# Patient Record
Sex: FEMALE | Race: WHITE | HISPANIC OR LATINO | Employment: OTHER | URBAN - METROPOLITAN AREA
[De-identification: names, ages, dates, MRNs, and addresses within clinical notes are randomized per-mention and may not be internally consistent; named-entity substitution may affect disease eponyms.]

---

## 2019-04-01 ENCOUNTER — OFFICE VISIT (OUTPATIENT)
Dept: PRIMARY CARE CLINIC | Facility: CLINIC | Age: 84
End: 2019-04-01
Payer: MEDICARE

## 2019-04-01 VITALS
DIASTOLIC BLOOD PRESSURE: 60 MMHG | OXYGEN SATURATION: 93 % | WEIGHT: 180 LBS | SYSTOLIC BLOOD PRESSURE: 132 MMHG | BODY MASS INDEX: 30.73 KG/M2 | HEART RATE: 71 BPM | RESPIRATION RATE: 16 BRPM | HEIGHT: 64 IN

## 2019-04-01 DIAGNOSIS — E03.9 HYPOTHYROIDISM, UNSPECIFIED TYPE: ICD-10-CM

## 2019-04-01 DIAGNOSIS — I25.10 CORONARY ARTERY DISEASE, ANGINA PRESENCE UNSPECIFIED, UNSPECIFIED VESSEL OR LESION TYPE, UNSPECIFIED WHETHER NATIVE OR TRANSPLANTED HEART: ICD-10-CM

## 2019-04-01 DIAGNOSIS — I50.9 CONGESTIVE HEART FAILURE, UNSPECIFIED HF CHRONICITY, UNSPECIFIED HEART FAILURE TYPE: ICD-10-CM

## 2019-04-01 DIAGNOSIS — I10 BENIGN ESSENTIAL HYPERTENSION: ICD-10-CM

## 2019-04-01 DIAGNOSIS — F03.90 DEMENTIA WITHOUT BEHAVIORAL DISTURBANCE, UNSPECIFIED DEMENTIA TYPE: Primary | ICD-10-CM

## 2019-04-01 PROCEDURE — 99214 OFFICE O/P EST MOD 30 MIN: CPT | Mod: S$GLB,,, | Performed by: FAMILY MEDICINE

## 2019-04-01 PROCEDURE — 99214 PR OFFICE/OUTPT VISIT, EST, LEVL IV, 30-39 MIN: ICD-10-PCS | Mod: S$GLB,,, | Performed by: FAMILY MEDICINE

## 2019-04-01 RX ORDER — ASPIRIN 81 MG/1
81 TABLET ORAL DAILY
COMMUNITY

## 2019-04-01 RX ORDER — EZETIMIBE 10 MG/1
TABLET ORAL
COMMUNITY
End: 2019-04-01

## 2019-04-01 RX ORDER — TRAMADOL HYDROCHLORIDE 50 MG/1
TABLET ORAL
COMMUNITY
Start: 2017-03-30

## 2019-04-01 RX ORDER — LEVOTHYROXINE SODIUM 88 UG/1
TABLET ORAL
COMMUNITY
Start: 2019-01-06

## 2019-04-01 RX ORDER — PRAVASTATIN SODIUM 20 MG/1
TABLET ORAL
COMMUNITY
Start: 2019-01-25 | End: 2019-04-01

## 2019-04-01 RX ORDER — HYDROCODONE BITARTRATE AND ACETAMINOPHEN 7.5; 325 MG/1; MG/1
TABLET ORAL
COMMUNITY
End: 2019-04-01

## 2019-04-01 RX ORDER — LEVOFLOXACIN 500 MG/1
TABLET, FILM COATED ORAL
COMMUNITY
End: 2019-04-01

## 2019-04-01 RX ORDER — AMLODIPINE BESYLATE 5 MG/1
5 TABLET ORAL DAILY
COMMUNITY

## 2019-04-01 RX ORDER — NORTRIPTYLINE HYDROCHLORIDE 10 MG/1
CAPSULE ORAL
COMMUNITY
Start: 2019-01-24 | End: 2019-07-17 | Stop reason: SDUPTHER

## 2019-04-01 RX ORDER — CHOLECALCIFEROL (VITAMIN D3) 25 MCG
1000 TABLET ORAL DAILY
COMMUNITY

## 2019-04-01 RX ORDER — MELOXICAM 7.5 MG/1
TABLET ORAL
COMMUNITY
End: 2019-04-01

## 2019-04-01 RX ORDER — PROMETHAZINE HYDROCHLORIDE 25 MG/1
25 TABLET ORAL EVERY 6 HOURS PRN
COMMUNITY

## 2019-04-01 RX ORDER — ASCORBIC ACID 500 MG
500 TABLET ORAL DAILY
COMMUNITY

## 2019-04-01 RX ORDER — LORAZEPAM 0.5 MG/1
TABLET ORAL
COMMUNITY
End: 2019-10-01 | Stop reason: SDUPTHER

## 2019-04-01 RX ORDER — POTASSIUM CHLORIDE 750 MG/1
10 TABLET, EXTENDED RELEASE ORAL
COMMUNITY
Start: 2019-01-17 | End: 2019-12-03 | Stop reason: SDUPTHER

## 2019-04-01 RX ORDER — CALCIUM CARBONATE 200(500)MG
1 TABLET,CHEWABLE ORAL DAILY
COMMUNITY

## 2019-04-01 RX ORDER — FUROSEMIDE 40 MG/1
TABLET ORAL
COMMUNITY
Start: 2019-01-17 | End: 2019-12-03 | Stop reason: SDUPTHER

## 2019-04-01 RX ORDER — ROPINIROLE 0.5 MG/1
TABLET, FILM COATED ORAL
COMMUNITY
End: 2020-08-25 | Stop reason: SDUPTHER

## 2019-04-01 RX ORDER — NAPROXEN SODIUM 220 MG/1
162 TABLET, FILM COATED ORAL DAILY
Refills: 0 | COMMUNITY
Start: 2019-04-01 | End: 2020-03-31

## 2019-04-11 PROBLEM — G25.9 EXTRAPYRAMIDAL DISEASE: Status: ACTIVE | Noted: 2019-04-11

## 2019-04-11 PROBLEM — G25.81 RESTLESS LEGS: Status: ACTIVE | Noted: 2019-04-11

## 2019-04-11 PROBLEM — I10 BENIGN ESSENTIAL HYPERTENSION: Status: ACTIVE | Noted: 2019-04-11

## 2019-04-11 PROBLEM — I50.9 CONGESTIVE HEART FAILURE: Status: ACTIVE | Noted: 2019-04-11

## 2019-04-11 PROBLEM — E03.9 HYPOTHYROIDISM: Status: ACTIVE | Noted: 2019-04-11

## 2019-04-11 PROBLEM — M19.90 OSTEOARTHRITIS: Status: ACTIVE | Noted: 2019-04-11

## 2019-04-11 PROBLEM — R53.81 MALAISE AND FATIGUE: Status: ACTIVE | Noted: 2019-04-11

## 2019-04-11 PROBLEM — F03.90 DEMENTIA: Status: ACTIVE | Noted: 2019-04-11

## 2019-04-11 PROBLEM — M81.0 SENILE OSTEOPOROSIS: Status: ACTIVE | Noted: 2019-04-11

## 2019-04-11 PROBLEM — R73.09 ABNORMAL GLUCOSE LEVEL: Status: ACTIVE | Noted: 2019-04-11

## 2019-04-11 PROBLEM — K21.9 GASTROESOPHAGEAL REFLUX DISEASE: Status: ACTIVE | Noted: 2019-04-11

## 2019-04-11 PROBLEM — R53.83 MALAISE AND FATIGUE: Status: ACTIVE | Noted: 2019-04-11

## 2019-04-11 PROBLEM — R15.9 INCONTINENCE OF FECES: Status: ACTIVE | Noted: 2019-04-11

## 2019-04-11 NOTE — PROGRESS NOTES
Subjective:       Patient ID: Maryanne Vogel is a 89 y.o. female.    Chief Complaint: Follow-up    Patient presents for follow-up on her chronic conditions.  She is brought to the appointment by her daughter who is her main caregiver.  She has severe dementia and it continues to worsen every 6 months to a year.  She is put food on the stove and forgot about it as well as wandering of it.  This is new for her.  She had never done these things before.  Her daughter does not know how long she will be able to care for them at home.  She also cares for her father in the same house.  She has history of hypertension which has been well controlled on medications without side effects.  Also with coronary artery disease and congestive heart failure.  Both of these have been well controlled via symptoms that her daughter can follow.  Also with history of hypothyroidism.  She takes replacement hormone but the patient cannot give his history.  We are following her TSH.      Review of Systems   Constitutional: Positive for fatigue (Baseline). Negative for chills, fever and unexpected weight change.   Eyes: Negative for visual disturbance.   Respiratory: Negative for cough, shortness of breath and wheezing.    Cardiovascular: Negative for chest pain and palpitations.   Gastrointestinal: Negative for abdominal pain and blood in stool.   Genitourinary: Negative for difficulty urinating and dysuria.   Musculoskeletal: Positive for arthralgias. Negative for back pain.        Weakness   Skin: Negative for rash.   Neurological: Negative for dizziness, syncope, light-headedness, numbness and headaches.   Hematological: Negative for adenopathy.   Psychiatric/Behavioral: Positive for confusion. Negative for dysphoric mood. The patient is not nervous/anxious.        Objective:      Physical Exam   Constitutional: She is oriented to person, place, and time. She appears well-developed and well-nourished.   Obese and confused   HENT:   Head:  Normocephalic and atraumatic.   Eyes: Conjunctivae and EOM are normal.   Neck: Neck supple. No thyromegaly present.   Cardiovascular: Normal rate, regular rhythm and intact distal pulses.   Murmur heard.  Pulmonary/Chest: Effort normal and breath sounds normal.   Abdominal: Soft. Bowel sounds are normal. She exhibits no mass. There is no tenderness. There is no rebound and no guarding.   Musculoskeletal: Normal range of motion. She exhibits edema (Trace).   Neurological: She is alert and oriented to person, place, and time.   Skin: Skin is dry. No rash noted.   Vitals reviewed.      Assessment:       1. Dementia without behavioral disturbance, unspecified dementia type    2. Coronary artery disease, angina presence unspecified, unspecified vessel or lesion type, unspecified whether native or transplanted heart    3. Benign essential hypertension    4. Congestive heart failure, unspecified HF chronicity, unspecified heart failure type    5. Hypothyroidism, unspecified type        Plan:       Dementia without behavioral disturbance, unspecified dementia type    Coronary artery disease, angina presence unspecified, unspecified vessel or lesion type, unspecified whether native or transplanted heart  -     aspirin 81 MG Chew; Take 2 tablets (162 mg total) by mouth once daily.; Refill: 0    Benign essential hypertension    Congestive heart failure, unspecified HF chronicity, unspecified heart failure type    Hypothyroidism, unspecified type              DISCUSSION:  Labs look okay.  Continue medications.  We had a long discussion on her cognitive decline.  It should continue to slowly worsen.  They are looking at placement in a nursing home.  She is speaking to her siblings about.  Their main concern is that both her parents may worsened substantially going to a new place this is a real concern but they are trying to balance this with their safety at home.

## 2019-07-18 RX ORDER — NORTRIPTYLINE HYDROCHLORIDE 10 MG/1
CAPSULE ORAL
Qty: 90 CAPSULE | Refills: 3 | Status: SHIPPED | OUTPATIENT
Start: 2019-07-18 | End: 2020-06-02

## 2019-10-01 ENCOUNTER — OFFICE VISIT (OUTPATIENT)
Dept: PRIMARY CARE CLINIC | Facility: CLINIC | Age: 84
End: 2019-10-01
Payer: MEDICARE

## 2019-10-01 VITALS
WEIGHT: 162 LBS | RESPIRATION RATE: 16 BRPM | DIASTOLIC BLOOD PRESSURE: 70 MMHG | OXYGEN SATURATION: 97 % | SYSTOLIC BLOOD PRESSURE: 138 MMHG | HEART RATE: 91 BPM | BODY MASS INDEX: 27.81 KG/M2

## 2019-10-01 DIAGNOSIS — Z02.6 ENCOUNTER FOR INSURANCE EXAMINATION: ICD-10-CM

## 2019-10-01 DIAGNOSIS — I10 BENIGN ESSENTIAL HYPERTENSION: ICD-10-CM

## 2019-10-01 DIAGNOSIS — E03.9 HYPOTHYROIDISM, UNSPECIFIED TYPE: ICD-10-CM

## 2019-10-01 DIAGNOSIS — G47.00 INSOMNIA, UNSPECIFIED TYPE: Primary | ICD-10-CM

## 2019-10-01 DIAGNOSIS — F03.90 DEMENTIA WITHOUT BEHAVIORAL DISTURBANCE, UNSPECIFIED DEMENTIA TYPE: ICD-10-CM

## 2019-10-01 DIAGNOSIS — I50.9 CONGESTIVE HEART FAILURE, UNSPECIFIED HF CHRONICITY, UNSPECIFIED HEART FAILURE TYPE: ICD-10-CM

## 2019-10-01 PROCEDURE — 99214 PR OFFICE/OUTPT VISIT, EST, LEVL IV, 30-39 MIN: ICD-10-PCS | Mod: 25,S$GLB,, | Performed by: FAMILY MEDICINE

## 2019-10-01 PROCEDURE — 90662 IIV NO PRSV INCREASED AG IM: CPT | Mod: S$GLB,,, | Performed by: FAMILY MEDICINE

## 2019-10-01 PROCEDURE — G0008 FLU VACCINE - HIGH DOSE (65+) PRESERVATIVE FREE IM: ICD-10-PCS | Mod: S$GLB,,, | Performed by: FAMILY MEDICINE

## 2019-10-01 PROCEDURE — 99214 OFFICE O/P EST MOD 30 MIN: CPT | Mod: 25,S$GLB,, | Performed by: FAMILY MEDICINE

## 2019-10-01 PROCEDURE — G0008 ADMIN INFLUENZA VIRUS VAC: HCPCS | Mod: S$GLB,,, | Performed by: FAMILY MEDICINE

## 2019-10-01 PROCEDURE — 90662 FLU VACCINE - HIGH DOSE (65+) PRESERVATIVE FREE IM: ICD-10-PCS | Mod: S$GLB,,, | Performed by: FAMILY MEDICINE

## 2019-10-01 RX ORDER — OMEPRAZOLE 20 MG/1
20 CAPSULE, DELAYED RELEASE ORAL DAILY
COMMUNITY

## 2019-10-01 RX ORDER — LORAZEPAM 0.5 MG/1
TABLET ORAL
Qty: 30 TABLET | Refills: 5 | Status: SHIPPED | OUTPATIENT
Start: 2019-10-01

## 2019-10-01 NOTE — PROGRESS NOTES
Subjective:       Patient ID: Maryanne Vogel is a 89 y.o. female.    Chief Complaint: No chief complaint on file.    Patient is a very elderly female with dementia who presents for follow-up on her chronic conditions.  Her daughter is present at the exam and gives the history.  Their biggest concern is her getting up and wandering at night.  She will forget to use her walker.  They are concerned about the activity that she will do while everyone is sleeping.  They are concerned about her trying to cook etc.  The daughter is asking about med changes that could be done to help keep her in bed at night.  Also with hypertension.  This has been well controlled on medications without side effects.  Also with history of congestive heart failure.  There been no exacerbations recently.  She is sedentary and does not leave the home except for doctor's appointments.  Also with hypothyroidism.  She is currently on 88 mcg for this and her last check of this was good.           Review of Systems   Constitutional: Negative for chills, fatigue, fever and unexpected weight change.   Eyes: Negative for visual disturbance.   Respiratory: Negative for cough, shortness of breath and wheezing.    Cardiovascular: Negative for chest pain and palpitations.   Gastrointestinal: Negative for abdominal pain and blood in stool.   Genitourinary: Negative for difficulty urinating and dysuria.   Musculoskeletal: Negative for back pain.   Skin: Negative for rash.   Neurological: Positive for weakness. Negative for dizziness, syncope, light-headedness, numbness and headaches.   Hematological: Negative for adenopathy.   Psychiatric/Behavioral: Positive for agitation (Minimally) and sleep disturbance. Negative for dysphoric mood. The patient is nervous/anxious.      Medication List with Changes/Refills   Current Medications    AMLODIPINE (NORVASC) 5 MG TABLET    Take 5 mg by mouth once daily.    ASCORBIC ACID, VITAMIN C, (VITAMIN C) 500 MG TABLET     "Take 500 mg by mouth once daily.    ASPIRIN (ECOTRIN) 81 MG EC TABLET    Take 81 mg by mouth once daily.    ASPIRIN 81 MG CHEW    Take 2 tablets (162 mg total) by mouth once daily.    CALCIUM CARBONATE (TUMS) 200 MG CALCIUM (500 MG) CHEWABLE TABLET    Take 1 tablet by mouth once daily.    FUROSEMIDE (LASIX) 40 MG TABLET    I/2 tablet one day and 1 full tablet the next    LEVOTHYROXINE (SYNTHROID) 88 MCG TABLET        NORTRIPTYLINE (PAMELOR) 10 MG CAPSULE    TAKE 1 CAPSULE AT BEDTIME    OMEPRAZOLE (PRILOSEC) 20 MG CAPSULE    Take 20 mg by mouth once daily.    POTASSIUM CHLORIDE SA (K-DUR,KLOR-CON) 10 MEQ TABLET    10 mEq. 1/2 tablet every other day    PROMETHAZINE (PHENERGAN) 25 MG TABLET    Take 25 mg by mouth every 6 (six) hours as needed for Nausea.    PSYLLIUM (METAMUCIL) PACKET    Take 1 packet by mouth once daily.    ROPINIROLE (REQUIP) 0.5 MG TABLET    ropinirole 0.5 mg tablet    TRAMADOL (ULTRAM) 50 MG TABLET    tramadol 50 mg tablet    VITAMIN D (VITAMIN D3) 1000 UNITS TAB    Take 1,000 Units by mouth once daily.   Changed and/or Refilled Medications    Modified Medication Previous Medication    LORAZEPAM (ATIVAN) 0.5 MG TABLET LORazepam (ATIVAN) 0.5 MG tablet       lorazepam 0.5 mg tablet  Take 1 tablet by oral route q hs as needed.    lorazepam 0.5 mg tablet   Take 1 tablet every day by oral route as needed.         Objective:      /70   Pulse 91   Resp 16   Wt 73.5 kg (162 lb)   SpO2 97%   BMI 27.81 kg/m²   Estimated body mass index is 27.81 kg/m² as calculated from the following:    Height as of 4/1/19: 5' 4" (1.626 m).    Weight as of this encounter: 73.5 kg (162 lb).  Physical Exam   Constitutional: She is oriented to person, place, and time. She appears well-developed and well-nourished.   Chronically ill   HENT:   Head: Normocephalic and atraumatic.   Eyes: Conjunctivae and EOM are normal.   Neck: Neck supple. No thyromegaly present.   Cardiovascular: Normal rate, regular rhythm and intact " distal pulses.   No murmur heard.  Pulmonary/Chest: Effort normal and breath sounds normal.   Abdominal: Soft. Bowel sounds are normal. She exhibits no mass. There is no tenderness. There is no rebound and no guarding.   Musculoskeletal: Normal range of motion.   Neurological: She is alert and oriented to person, place, and time.   Skin: Skin is dry. No rash noted.   Psychiatric: She has a normal mood and affect.   Oriented only to self.   Vitals reviewed.      Assessment:       Problem List Items Addressed This Visit        Neuro    Dementia       Cardiac/Vascular    Benign essential hypertension    Congestive heart failure       Endocrine    Hypothyroidism      Other Visit Diagnoses     Insomnia, unspecified type    -  Primary    Relevant Medications    LORazepam (ATIVAN) 0.5 MG tablet    Encounter for insurance examination        Relevant Orders    ABO/Rh            Plan:       Insomnia, unspecified type  -     LORazepam (ATIVAN) 0.5 MG tablet; lorazepam 0.5 mg tablet  Take 1 tablet by oral route q hs as needed.  Dispense: 30 tablet; Refill: 5    Benign essential hypertension    Congestive heart failure, unspecified HF chronicity, unspecified heart failure type    Dementia without behavioral disturbance, unspecified dementia type    Hypothyroidism, unspecified type    Encounter for insurance examination  -     ABO/Rh; Future; Expected date: 10/01/2019    Other orders  -     Influenza - High Dose (65+) (PF) (IM)              DISCUSSION:  Stable and continue meds, except for her dementia.  We discussed the difficulty in treating this.  Any medication that would help her rest would increase her chance of confusion psychosis and falls.  Her safety is also impacted as well as the safety of others when she gets up at night, though.  After a detailed discussion of this it was decided to give the lorazepam a half to a whole pill every night before bed with very close monitoring of her.  The insurance also needed her  blood tight.  We ordered this blood work.

## 2019-10-03 ENCOUNTER — TELEPHONE (OUTPATIENT)
Dept: PRIMARY CARE CLINIC | Facility: CLINIC | Age: 84
End: 2019-10-03

## 2019-10-03 LAB
ABO GROUP BLD: NORMAL
RH BLD: NORMAL

## 2019-10-03 NOTE — TELEPHONE ENCOUNTER
----- Message from Ranjith Cassidy MD sent at 10/3/2019 12:21 PM CDT -----  Please let her daughter know that her mom's blood type is A positive.  She may need this printed out as well.

## 2019-10-04 ENCOUNTER — TELEPHONE (OUTPATIENT)
Dept: PRIMARY CARE CLINIC | Facility: CLINIC | Age: 84
End: 2019-10-04

## 2019-10-07 ENCOUNTER — TELEPHONE (OUTPATIENT)
Dept: PRIMARY CARE CLINIC | Facility: CLINIC | Age: 84
End: 2019-10-07

## 2019-12-03 RX ORDER — POTASSIUM CHLORIDE 750 MG/1
TABLET, EXTENDED RELEASE ORAL
Qty: 90 TABLET | Refills: 0 | Status: SHIPPED | OUTPATIENT
Start: 2019-12-03 | End: 2020-05-05

## 2019-12-03 RX ORDER — FUROSEMIDE 40 MG/1
TABLET ORAL
Qty: 90 TABLET | Refills: 0 | Status: SHIPPED | OUTPATIENT
Start: 2019-12-03 | End: 2020-05-05

## 2020-05-05 ENCOUNTER — TELEPHONE (OUTPATIENT)
Dept: PRIMARY CARE CLINIC | Facility: CLINIC | Age: 85
End: 2020-05-05

## 2020-05-05 RX ORDER — POTASSIUM CHLORIDE 750 MG/1
TABLET, EXTENDED RELEASE ORAL
Qty: 90 TABLET | Refills: 3 | Status: SHIPPED | OUTPATIENT
Start: 2020-05-05

## 2020-05-05 RX ORDER — FUROSEMIDE 40 MG/1
TABLET ORAL
Qty: 90 TABLET | Refills: 3 | Status: SHIPPED | OUTPATIENT
Start: 2020-05-05

## 2020-05-05 NOTE — TELEPHONE ENCOUNTER
----- Message from Radha Pearl sent at 5/5/2020  4:02 PM CDT -----  Contact: Patient daughter Bria   patient calling to get refill on meds    furosemide (LASIX) 40 MG tablet    potassium chloride SA (K-DURWICHOOR-CON) 10 MEQ tablet    [phone]. Tks

## 2020-06-02 RX ORDER — NORTRIPTYLINE HYDROCHLORIDE 10 MG/1
CAPSULE ORAL
Qty: 90 CAPSULE | Refills: 3 | Status: SHIPPED | OUTPATIENT
Start: 2020-06-02

## 2020-07-13 ENCOUNTER — OFFICE VISIT (OUTPATIENT)
Dept: PRIMARY CARE CLINIC | Facility: CLINIC | Age: 85
End: 2020-07-13
Payer: MEDICARE

## 2020-07-13 VITALS
HEART RATE: 64 BPM | HEIGHT: 64 IN | RESPIRATION RATE: 18 BRPM | TEMPERATURE: 98 F | WEIGHT: 165 LBS | SYSTOLIC BLOOD PRESSURE: 110 MMHG | BODY MASS INDEX: 28.17 KG/M2 | OXYGEN SATURATION: 98 % | DIASTOLIC BLOOD PRESSURE: 66 MMHG

## 2020-07-13 DIAGNOSIS — I50.9 CONGESTIVE HEART FAILURE, UNSPECIFIED HF CHRONICITY, UNSPECIFIED HEART FAILURE TYPE: ICD-10-CM

## 2020-07-13 DIAGNOSIS — I10 BENIGN ESSENTIAL HYPERTENSION: Primary | ICD-10-CM

## 2020-07-13 DIAGNOSIS — F03.90 DEMENTIA WITHOUT BEHAVIORAL DISTURBANCE, UNSPECIFIED DEMENTIA TYPE: ICD-10-CM

## 2020-07-13 DIAGNOSIS — E03.9 HYPOTHYROIDISM, UNSPECIFIED TYPE: ICD-10-CM

## 2020-07-13 DIAGNOSIS — I25.10 CORONARY ARTERY DISEASE, ANGINA PRESENCE UNSPECIFIED, UNSPECIFIED VESSEL OR LESION TYPE, UNSPECIFIED WHETHER NATIVE OR TRANSPLANTED HEART: ICD-10-CM

## 2020-07-13 PROCEDURE — 99214 PR OFFICE/OUTPT VISIT, EST, LEVL IV, 30-39 MIN: ICD-10-PCS | Mod: S$GLB,,, | Performed by: FAMILY MEDICINE

## 2020-07-13 PROCEDURE — 1159F PR MEDICATION LIST DOCUMENTED IN MEDICAL RECORD: ICD-10-PCS | Mod: S$GLB,,, | Performed by: FAMILY MEDICINE

## 2020-07-13 PROCEDURE — 99214 OFFICE O/P EST MOD 30 MIN: CPT | Mod: S$GLB,,, | Performed by: FAMILY MEDICINE

## 2020-07-13 PROCEDURE — 1159F MED LIST DOCD IN RCRD: CPT | Mod: S$GLB,,, | Performed by: FAMILY MEDICINE

## 2020-07-13 NOTE — PROGRESS NOTES
Subjective:       Patient ID: Maryanne Vogel is a 89 y.o. female.    Chief Complaint: No chief complaint on file.    Patient is a 89-year-old female who recently lost her  of many many years.  She has dementia as well as many other medical conditions.  Her engagement with her family has decreased since her 's death.  She does not recall it happening but her overall mental state is decreased.  She is still able to transfer.  She is getting more weak.  She does forget to use her walker occasionally.  No recent falls.  She has history of hypertension congestive heart failure and coronary artery disease.  All these appear stable.  Her daughter states all she does is eat and sleep.  Also with hypothyroidism.  She sees an endocrinologist for this.  They did blood work a couple of months ago and her thyroid was completely normal.        Review of Systems   Constitutional: Positive for fatigue. Negative for chills, fever and unexpected weight change.   Eyes: Negative for visual disturbance.   Respiratory: Negative for cough, shortness of breath and wheezing.    Cardiovascular: Negative for chest pain and palpitations.   Gastrointestinal: Negative for abdominal pain and blood in stool.   Genitourinary: Negative for difficulty urinating and dysuria.   Musculoskeletal: Negative for back pain.   Skin: Negative for rash.   Neurological: Negative for dizziness, syncope, light-headedness, numbness and headaches.   Hematological: Negative for adenopathy.   Psychiatric/Behavioral: Negative for dysphoric mood. The patient is not nervous/anxious.         Withdrawn and sleeps all the time.     Medication List with Changes/Refills   Current Medications    AMLODIPINE (NORVASC) 5 MG TABLET    Take 5 mg by mouth once daily.    ASCORBIC ACID, VITAMIN C, (VITAMIN C) 500 MG TABLET    Take 500 mg by mouth once daily.    ASPIRIN (ECOTRIN) 81 MG EC TABLET    Take 81 mg by mouth once daily.    CALCIUM CARBONATE (TUMS) 200 MG  "CALCIUM (500 MG) CHEWABLE TABLET    Take 1 tablet by mouth once daily.    FUROSEMIDE (LASIX) 40 MG TABLET    TAKE 1 TABLET EVERY DAY    LEVOTHYROXINE (SYNTHROID) 88 MCG TABLET        LORAZEPAM (ATIVAN) 0.5 MG TABLET    lorazepam 0.5 mg tablet  Take 1 tablet by oral route q hs as needed.    NORTRIPTYLINE (PAMELOR) 10 MG CAPSULE    TAKE 1 CAPSULE AT BEDTIME    OMEPRAZOLE (PRILOSEC) 20 MG CAPSULE    Take 20 mg by mouth once daily.    POTASSIUM CHLORIDE SA (K-DUR,KLOR-CON) 10 MEQ TABLET    TAKE 1 TABLET EVERY DAY    PROMETHAZINE (PHENERGAN) 25 MG TABLET    Take 25 mg by mouth every 6 (six) hours as needed for Nausea.    PSYLLIUM (METAMUCIL) PACKET    Take 1 packet by mouth once daily.    ROPINIROLE (REQUIP) 0.5 MG TABLET    ropinirole 0.5 mg tablet    TRAMADOL (ULTRAM) 50 MG TABLET    tramadol 50 mg tablet    VITAMIN D (VITAMIN D3) 1000 UNITS TAB    Take 1,000 Units by mouth once daily.         Objective:      There were no vitals taken for this visit.  Estimated body mass index is 27.81 kg/m² as calculated from the following:    Height as of 4/1/19: 5' 4" (1.626 m).    Weight as of 10/1/19: 73.5 kg (162 lb).  Physical Exam  Vitals signs reviewed.   Constitutional:       Appearance: Normal appearance. She is well-developed. She is obese.      Comments: She appears very similar to the last time that I saw her.   HENT:      Head: Normocephalic and atraumatic.   Eyes:      Conjunctiva/sclera: Conjunctivae normal.   Neck:      Musculoskeletal: Neck supple.      Thyroid: No thyromegaly.   Cardiovascular:      Rate and Rhythm: Normal rate and regular rhythm.      Heart sounds: No murmur.   Pulmonary:      Effort: Pulmonary effort is normal.      Breath sounds: Normal breath sounds.   Abdominal:      General: Bowel sounds are normal.      Palpations: Abdomen is soft. There is no mass.      Tenderness: There is no abdominal tenderness. There is no guarding or rebound.   Musculoskeletal: Normal range of motion.   Skin:     " General: Skin is dry.      Findings: No rash.   Neurological:      Mental Status: She is alert and oriented to person, place, and time.      Comments: Forgetful   Psychiatric:         Mood and Affect: Mood normal.         Behavior: Behavior normal.         Thought Content: Thought content normal.         Judgment: Judgment normal.         Assessment:       Problem List Items Addressed This Visit        Neuro    Dementia       Cardiac/Vascular    Benign essential hypertension - Primary    Congestive heart failure       Endocrine    Hypothyroidism      Other Visit Diagnoses     Coronary artery disease, angina presence unspecified, unspecified vessel or lesion type, unspecified whether native or transplanted heart                Plan:       Benign essential hypertension    Congestive heart failure, unspecified HF chronicity, unspecified heart failure type    Coronary artery disease, angina presence unspecified, unspecified vessel or lesion type, unspecified whether native or transplanted heart    Dementia without behavioral disturbance, unspecified dementia type    Hypothyroidism, unspecified type              DISCUSSION:  Get labs from Endocrinology.  There is no reason to get any more labs on her.  She is stable physically but has gone down minimally.  I expect her to continue to decline.  The family still tries to engage with her but given her age medical problems an the death of her  this will be difficult.  She does have a very supportive family.  Her daughter is the main caregiver.     Home

## 2020-08-25 NOTE — TELEPHONE ENCOUNTER
----- Message from Radha Pearl sent at 8/25/2020 10:45 AM CDT -----  Patient need to speak to nurse regarding medication. Call back number [phone]. TKS

## 2020-09-08 RX ORDER — ROPINIROLE 0.5 MG/1
TABLET, FILM COATED ORAL
Qty: 30 TABLET | Refills: 5 | Status: SHIPPED | OUTPATIENT
Start: 2020-09-08

## 2020-09-16 ENCOUNTER — TELEPHONE (OUTPATIENT)
Dept: PRIMARY CARE CLINIC | Facility: CLINIC | Age: 85
End: 2020-09-16

## 2020-09-16 NOTE — TELEPHONE ENCOUNTER
----- Message from Bri Mcghee sent at 9/16/2020  9:35 AM CDT -----  Type:  Needs Medical Advice    Who Called: pt  Symptoms (please be specific): broken ribs   How long has patient had these symptoms:    Pharmacy name and phone #:    Would the patient rather a call back or a response via [facility]? Call back  Best Call Back Number: [phone]  Additional Information: Caller states that the pt had 8 broken ribs and they needed to speak to someone ASAP

## 2020-09-16 NOTE — TELEPHONE ENCOUNTER
She needs a nursing home for skilled care, but I don't know of any that are open yet in [place].  I think [facility] may have a nursing home, as well as [facility].  I recommend calling them to see if there is room.  I do not believe Home Health will be able to meet her needs.

## 2020-09-16 NOTE — TELEPHONE ENCOUNTER
Patient has broken ribs, collar bone and partial collapsed upper lung. She is currently at avail but is needing some help once she Is released, her daughter asked about home health or a nursing home in the next few weeks. She is currently displaced in [place]. She just wanted to know her options.

## 2022-03-31 ENCOUNTER — TELEPHONE (OUTPATIENT)
Dept: HEMATOLOGY/ONCOLOGY | Facility: CLINIC | Age: 87
End: 2022-03-31
Payer: MEDICARE